# Patient Record
Sex: FEMALE | Race: WHITE | NOT HISPANIC OR LATINO | URBAN - METROPOLITAN AREA
[De-identification: names, ages, dates, MRNs, and addresses within clinical notes are randomized per-mention and may not be internally consistent; named-entity substitution may affect disease eponyms.]

---

## 2023-10-30 ENCOUNTER — APPOINTMENT (RX ONLY)
Dept: URBAN - METROPOLITAN AREA CLINIC 45 | Facility: CLINIC | Age: 88
Setting detail: DERMATOLOGY
End: 2023-10-30

## 2023-10-30 DIAGNOSIS — L81.8 OTHER SPECIFIED DISORDERS OF PIGMENTATION: ICD-10-CM

## 2023-10-30 DIAGNOSIS — I83.9 ASYMPTOMATIC VARICOSE VEINS OF LOWER EXTREMITIES: ICD-10-CM

## 2023-10-30 DIAGNOSIS — L72.0 EPIDERMAL CYST: ICD-10-CM

## 2023-10-30 DIAGNOSIS — L82.1 OTHER SEBORRHEIC KERATOSIS: ICD-10-CM

## 2023-10-30 PROBLEM — I83.93 ASYMPTOMATIC VARICOSE VEINS OF BILATERAL LOWER EXTREMITIES: Status: ACTIVE | Noted: 2023-10-30

## 2023-10-30 PROCEDURE — ? COUNSELING

## 2023-10-30 PROCEDURE — ? SUNSCREEN RECOMMENDATIONS

## 2023-10-30 PROCEDURE — ? FULL BODY SKIN EXAM - DECLINED

## 2023-10-30 PROCEDURE — 99203 OFFICE O/P NEW LOW 30 MIN: CPT

## 2023-10-30 ASSESSMENT — LOCATION SIMPLE DESCRIPTION DERM
LOCATION SIMPLE: LEFT CALF
LOCATION SIMPLE: LEFT KNEE
LOCATION SIMPLE: INFERIOR FOREHEAD
LOCATION SIMPLE: RIGHT ANKLE
LOCATION SIMPLE: RIGHT PRETIBIAL REGION
LOCATION SIMPLE: RIGHT KNEE
LOCATION SIMPLE: LEFT PRETIBIAL REGION

## 2023-10-30 ASSESSMENT — LOCATION DETAILED DESCRIPTION DERM
LOCATION DETAILED: LEFT KNEE
LOCATION DETAILED: RIGHT DISTAL PRETIBIAL REGION
LOCATION DETAILED: LEFT DISTAL CALF
LOCATION DETAILED: LEFT DISTAL PRETIBIAL REGION
LOCATION DETAILED: RIGHT KNEE
LOCATION DETAILED: INFERIOR MID FOREHEAD
LOCATION DETAILED: RIGHT POSTERIOR ANKLE

## 2023-10-30 ASSESSMENT — LOCATION ZONE DERM
LOCATION ZONE: LEG
LOCATION ZONE: FACE

## 2023-10-30 NOTE — PROCEDURE: FULL BODY SKIN EXAM - DECLINED
General
Detail Level: Simple
Instructions: This plan will send the code FBSD to the PM system.  DO NOT or CHANGE the price.
Price (Do Not Change): 0.00

## 2023-10-30 NOTE — HPI: OTHER
Condition:: Mole on lower lip - non tender\\nDiscoloration on both legs
Please Describe Your Condition:: PT states she has a mole on lower lip which is non tender with discoloration that has occurred over the past 20 years and also discoloration on both legs with varicose veins, she states she has not taken any medications

## 2025-06-16 ENCOUNTER — OFFICE VISIT (OUTPATIENT)
Dept: URBAN - METROPOLITAN AREA CLINIC 37 | Facility: CLINIC | Age: 89
End: 2025-06-16

## 2025-06-30 ENCOUNTER — DASHBOARD ENCOUNTERS (OUTPATIENT)
Age: 89
End: 2025-06-30

## 2025-06-30 ENCOUNTER — OFFICE VISIT (OUTPATIENT)
Dept: URBAN - METROPOLITAN AREA CLINIC 37 | Facility: CLINIC | Age: 89
End: 2025-06-30

## 2025-06-30 RX ORDER — SODIUM FLUORIDE 0.1 MG/ML
AS DIRECTED RINSE ORAL
OUTPATIENT
Start: 2025-06-30

## 2025-06-30 RX ORDER — CITALOPRAM 10 MG/1
1 TABLET TABLET, FILM COATED ORAL ONCE A DAY
Qty: 30 | Status: ON HOLD | COMMUNITY

## 2025-06-30 NOTE — HPI-TODAY'S VISIT:
Giselle Rosenberg, an 89-year-old female, presented for a chronic condition follow-up focused on her ongoing issues with bowel movement consistency and incomplete evacuation. Her son, acting as proxy, described that Giselle often experiences pasty stools that require frequent wiping and sometimes feels she does not completely evacuate. Despite these symptoms, she does not have watery stools or accidents and denies abdominal pain, nausea, or vomiting. Giselle typically has one to two bowel movements per day, and her symptoms improve after pelvic floor exercise, with bowel movements becoming more normal and dry. Her history includes previous use of fiber supplements, which seemed to worsen the consistency of her stools, making them looser and more pasty. After discontinuing the fiber supplements and modifying her diet-reducing dairy, sweets, and increasing vegetables, meats, and greens-her symptoms improved. Giselle has not tried probiotics before. She enjoys a varied diet, participates in daily exercise programs, and is not on any regular medications except for occasional fiber supplements. Her appetite is generally good, and she is described as living a healthy lifestyle, enjoying desserts and whipped cream. These strategies, along with her active lifestyle, have contributed to her overall well-being, though her sonremains concerned about finding the optimal diet for her age and symptoms.